# Patient Record
Sex: MALE | URBAN - METROPOLITAN AREA
[De-identification: names, ages, dates, MRNs, and addresses within clinical notes are randomized per-mention and may not be internally consistent; named-entity substitution may affect disease eponyms.]

---

## 2023-06-05 ENCOUNTER — APPOINTMENT (RX ONLY)
Dept: URBAN - METROPOLITAN AREA CLINIC 52 | Facility: CLINIC | Age: 28
Setting detail: DERMATOLOGY
End: 2023-06-05

## 2023-06-05 DIAGNOSIS — L65.9 NONSCARRING HAIR LOSS, UNSPECIFIED: ICD-10-CM | Status: WORSENING

## 2023-06-05 PROCEDURE — ? ADDITIONAL NOTES

## 2023-06-05 PROCEDURE — ? PRESCRIPTION

## 2023-06-05 PROCEDURE — ? COUNSELING

## 2023-06-05 PROCEDURE — 99204 OFFICE O/P NEW MOD 45 MIN: CPT

## 2023-06-05 RX ORDER — MINOXIDIL 5 %
SOLUTION, NON-ORAL TOPICAL
Qty: 180 | Refills: 2 | Status: CANCELLED | COMMUNITY
Start: 2023-06-05

## 2023-06-05 RX ORDER — MINOXIDIL 5 %
SOLUTION, NON-ORAL TOPICAL
Qty: 180 | Refills: 2 | Status: ERX

## 2023-06-05 RX ADMIN — Medication: at 00:00

## 2023-06-05 NOTE — PROCEDURE: ADDITIONAL NOTES
Render Risk Assessment In Note?: no
Detail Level: Simple
Additional Notes: Suggested the use of: nutrafol/vivscal for hair loss

## 2023-07-10 ENCOUNTER — APPOINTMENT (RX ONLY)
Dept: URBAN - METROPOLITAN AREA CLINIC 52 | Facility: CLINIC | Age: 28
Setting detail: DERMATOLOGY
End: 2023-07-10

## 2023-07-10 DIAGNOSIS — L65.9 NONSCARRING HAIR LOSS, UNSPECIFIED: ICD-10-CM

## 2023-07-10 PROCEDURE — ? COUNSELING

## 2023-07-10 PROCEDURE — ? PRESCRIPTION

## 2023-07-10 PROCEDURE — 99212 OFFICE O/P EST SF 10 MIN: CPT

## 2023-07-10 RX ORDER — FLUOCINONIDE 0.5 MG/ML
SOLUTION TOPICAL
Qty: 60 | Refills: 0 | Status: ERX | COMMUNITY
Start: 2023-07-10

## 2023-07-10 RX ADMIN — FLUOCINONIDE: 0.5 SOLUTION TOPICAL at 00:00

## 2023-08-07 ENCOUNTER — RX ONLY (OUTPATIENT)
Age: 28
Setting detail: RX ONLY
End: 2023-08-07

## 2023-08-07 ENCOUNTER — APPOINTMENT (RX ONLY)
Dept: URBAN - METROPOLITAN AREA CLINIC 52 | Facility: CLINIC | Age: 28
Setting detail: DERMATOLOGY
End: 2023-08-07

## 2023-08-07 DIAGNOSIS — L65.9 NONSCARRING HAIR LOSS, UNSPECIFIED: ICD-10-CM | Status: WORSENING

## 2023-08-07 PROCEDURE — ? COUNSELING

## 2023-08-07 PROCEDURE — 99213 OFFICE O/P EST LOW 20 MIN: CPT

## 2023-08-07 RX ORDER — FLUOCINONIDE 0.5 MG/ML
SOLUTION TOPICAL
Qty: 60 | Refills: 1 | Status: ERX

## 2023-08-07 RX ORDER — FLUOCINONIDE 0.5 MG/ML
SOLUTION TOPICAL
Qty: 60 | Refills: 1 | Status: CANCELLED